# Patient Record
Sex: MALE | Race: WHITE | NOT HISPANIC OR LATINO | URBAN - METROPOLITAN AREA
[De-identification: names, ages, dates, MRNs, and addresses within clinical notes are randomized per-mention and may not be internally consistent; named-entity substitution may affect disease eponyms.]

---

## 2023-05-04 ENCOUNTER — EMERGENCY (EMERGENCY)
Facility: HOSPITAL | Age: 28
LOS: 1 days | Discharge: HOME | End: 2023-05-04
Admitting: EMERGENCY MEDICINE
Payer: COMMERCIAL

## 2023-05-04 VITALS
SYSTOLIC BLOOD PRESSURE: 129 MMHG | HEART RATE: 67 BPM | HEIGHT: 74 IN | RESPIRATION RATE: 16 BRPM | OXYGEN SATURATION: 98 % | WEIGHT: 179.9 LBS | DIASTOLIC BLOOD PRESSURE: 74 MMHG | TEMPERATURE: 97 F
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
## 2023-05-04 DIAGNOSIS — H53.8 OTHER VISUAL DISTURBANCES: ICD-10-CM

## 2023-05-04 DIAGNOSIS — S09.90XA UNSPECIFIED INJURY OF HEAD, INITIAL ENCOUNTER: ICD-10-CM

## 2023-05-04 DIAGNOSIS — R53.83 OTHER FATIGUE: ICD-10-CM
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
## 2023-05-04 PROCEDURE — 99284 EMERGENCY DEPT VISIT MOD MDM: CPT

## 2023-05-04 PROCEDURE — 70450 CT HEAD/BRAIN W/O DYE: CPT | Mod: 26,MG

## 2023-05-04 PROCEDURE — G1004: CPT

## 2023-05-04 PROCEDURE — 99284 EMERGENCY DEPT VISIT MOD MDM: CPT | Mod: 25

## 2023-05-04 PROCEDURE — 70450 CT HEAD/BRAIN W/O DYE: CPT | Mod: MG

## 2023-05-04 NOTE — ED PROVIDER NOTE - NS ED ROS FT
Constitutional: No fever. No chills.  Eyes: No redness. No discharge. No vision change.   ENT: No sore throat. No ear pain.  Cardiovascular: No chest pain. No leg swelling.  Respiratory: No cough. No shortness of breath.  GI: No abdominal pain. No vomiting. No diarrhea.   MSK: No joint pain. No back pain.   Skin: No rash. No abrasions.   Neuro: No numbness. No weakness. +head injury.  Psych: No known mental health issues.

## 2023-05-04 NOTE — ED PROVIDER NOTE - CLINICAL SUMMARY MEDICAL DECISION MAKING FREE TEXT BOX
Pt afebrile and hemodynamically stable. He has a nonfocal neurologic exam today. Reports head injury x2. CT head without acute intracranial abnormality. Discussed post-concussive symptoms and counseled on supportive care. Will f/u with pmd. Return precautions given.

## 2023-05-04 NOTE — ED PROVIDER NOTE - OBJECTIVE STATEMENT
28m with no pmhx complaining of headache x 6 days s/p hitting his head on a door frame. Patient states he hit the R head 7 days ago and then hit the same spot 6 days ago. After the second time he developed headache, slightly "fuzzy" vision in the R eye, sensitivity to light, and fatigue. No LOC and not on AC. Pt has no previous concussions. Denies nausea, vomiting, dizziness, confusion, syncope, neck pain, numbness, tingling

## 2023-05-04 NOTE — ED ADULT TRIAGE NOTE - CHIEF COMPLAINT QUOTE
Pt sent from  with head injury. Pt states "I was in [place] and the door frames are small there, I hit the right side of my head 6 days ago, then again the next day". Pt c/o light sensitivity to R eye. Denies LOC, dizziness, vomiting.

## 2023-05-04 NOTE — ED PROVIDER NOTE - PHYSICAL EXAMINATION
VITAL SIGNS: I have reviewed nursing notes and confirm.  CONSTITUTIONAL: Well-developed; in no acute distress.   SKIN:  warm and dry, no acute rash.   HEAD:  normocephalic, atraumatic.  EYES: PERRL, EOM intact; conjunctiva and sclera clear.  ENT: No nasal discharge; airway clear.   NECK: Supple; non tender.  CARD: S1, S2 normal; no murmurs, gallops, or rubs. Regular rate and rhythm.   RESP:  Clear to auscultation b/l, no wheezes, rales or rhonchi.  ABD: Normal bowel sounds; soft; non-distended; non-tender; no guarding/ rebound.  EXT: Normal ROM. No clubbing, cyanosis or edema. 2+ pulses to b/l ue/le.  NEURO: Alert, oriented, grossly unremarkable. CN II-XII intact. Finger to nose intact. No pronator drift. ambulatory. 5/5 strength in all extremities. Sensation equal and intact.  PSYCH: Cooperative, mood and affect appropriate.

## 2023-05-04 NOTE — ED PROVIDER NOTE - PATIENT PORTAL LINK FT
You can access the FollowMyHealth Patient Portal offered by [facility] by registering at the following website: [email or web address]. By joining [facility]’s FollowMyHealth portal, you will also be able to view your health information using other applications (apps) compatible with our system.

## 2023-05-04 NOTE — ED ADULT NURSE NOTE - CHIEF COMPLAINT QUOTE
Pt sent from  with head injury. Pt states "I was in [place] and the door frames are small there, I hit the right side of my head 6 days ago, then again the next day". Pt c/o light sensitivity to R eye. Denies LOC, dizziness, vomiting.

## 2023-05-04 NOTE — ED PROVIDER NOTE - NSFOLLOWUPINSTRUCTIONS_ED_ALL_ED_FT
Your CT head did not show any abnormalities. This does not mean you do not have a mild concussion. A concussion can occur with any head injury and is diagnosed from your symptoms, not imaging. Concussion symptoms include: feeling of fogginess, headache, decreased concentration, mild nausea.     You can take tylenol 650mg up to four times daily for headache. It is okay to rest. Avoid prolonged screen time as this will make your symptoms worse. Avoid contact sports or activities where you may injure yourself (riding a bike) until your symptoms fully improve. Symptoms can last from days to several weeks. They should gradually improve and never suddenly get worse. Return to the Emergency Department if you develop severe headache, vision changes, vomiting, passing out, numbness, weakness, confusion or any other concerns.     If you have persistent symptoms, you can follow up with:  [facility]  [address] [place] [address]  [phone]

## 2023-05-04 NOTE — ED ADULT NURSE NOTE - OBJECTIVE STATEMENT
28y otherwise healthy male c/o HA x 6 days s/p hitting head into door frame. rpts HA, "fuzzy vision", photophobia and fatigue. no LOC. not on blood thinners. pt well appearing. denies CP, SOB, numbness/tingling, f/c. No acute distress noted at this time.

## 2024-01-29 NOTE — ED ADULT TRIAGE NOTE - STATUS:
"   Encounter Messages    No messages in this encounter     Upcoming Procedure Information  Message [id number]  From  Eric Copeland MD To  Susi Malone \"Myriam\" Sent and Delivered  1/24/2024  5:00 PM   Last Read in [facility]  1/24/2024  5:12 PM by Susi Malone       Dear Susi \"Myriam\",     Thank you for choosing [facility] OR or for your procedure. Being prepared for your procedure can help reduce anxiety.     In order to avoid delay or cancellation of your procedure, please take note of the following instructions to ensure you have a smooth experience.     If there have been any changes to your insurance, address, or phone number please call us immediately.  Continue to take your medications as prescribed by your primary care provider. Certain medications may need to be stopped before your procedure/surgery. Your prescribing doctor or surgeon will let you know. If you have any questions or concerns about the current medications you are taking, please talk to your primary care provider.  Let your surgeon know if there is a change in your health or you have a cold/flu, fever, sore throat, or cough.  Stop smoking, using street drugs, or consuming excessive alcohol.  Coordinate to have a responsible adult accompany you home after your procedure.     TWO DAYS BEFORE YOUR PROCEDURE          DO NOT SHAVE OR USE HAIR REMOVING PRODUCTS     ONE DAY BEFORE YOUR PROCEDURE     Complete your prep as instructed by Eric Copeland MD.  Do not eat or drink after midnight before your procedure, with the exception of water. You may drink water up to 2 hours before the procedure unless otherwise instructed.  You will receive a call to indicate arrival time prior to your procedure scheduled time.  Do not bring valuables to your procedure.  Do not wear jewelry to your procedure.  If advised by your prescribing doctor or surgeon, shower using Hibiclens (instructions below).                          Hibiclens instructions for the " "night before procedure/surgery AND the morning of procedure/surgery     Wash your hair with regular shampoo and rinse well.  Rinse your body with water.  Shower from the neck down with HIBICLENS soap.  Do NOT use Hibiclens on face, head/hair, or genitals.  For sensitive skin, test on your inner wrist first.  Discontinue use if you get a rash or are allergic to Chlorhexidine.  Rinse well.  Dry with a clean towel.     **After showering, do NOT apply hair products, lotions, powders, creams, makeup, nail polish, Vaseline, or deodorant.**     DAY OF PROCEDURE     Do not eat or drink anything. This includes gum or candy.  Take only the medications instructed by your doctor with a sip of water.  If advised by your prescribing doctor or surgeon, shower using Hibiclens (instructions above).  Brush your teeth before coming to the hospital.  Please check-in on time.      BRING ON THE DAY OF PROCEDURE     Identification and medical insurance cards  Pacemaker/AICD card, if applicable  Sleep apnea machine and mask, if applicable  List of medications. Include name, dose, and how often you take it  Any emergency medication you usually carry (i.e. Epi pen, inhaler, nitroglycerin)  Any durable medical equipment (DME) you use, such as walker, crutches, or cryotherapy device  Legal Guardian or Power of , if applicable, must be physically present or immediately available by phone.      LEAVE ALL VALUABLES AT HOME     If you have any questions regarding your procedure, please call us or Eric Copeland MD's office.     Sincerely,  Surgical team at [facility] OR     Please visit [facility]'s [employer] Login Page or Home Page for additional information about your procedure.           [facility]   [address]  [facility] [id number]        [employer] [place]     [employer] User Last Read On   Susi Malone \"Myriam\" 1/24/2024  5:12 PM     " Applied